# Patient Record
Sex: MALE | Race: OTHER | NOT HISPANIC OR LATINO | Employment: FULL TIME | URBAN - METROPOLITAN AREA
[De-identification: names, ages, dates, MRNs, and addresses within clinical notes are randomized per-mention and may not be internally consistent; named-entity substitution may affect disease eponyms.]

---

## 2020-07-10 ENCOUNTER — HOSPITAL ENCOUNTER (EMERGENCY)
Facility: HOSPITAL | Age: 50
Discharge: HOME | End: 2020-07-10
Attending: EMERGENCY MEDICINE | Admitting: EMERGENCY MEDICINE
Payer: COMMERCIAL

## 2020-07-10 VITALS
TEMPERATURE: 98.3 F | WEIGHT: 154.98 LBS | HEIGHT: 73 IN | RESPIRATION RATE: 16 BRPM | SYSTOLIC BLOOD PRESSURE: 144 MMHG | OXYGEN SATURATION: 98 % | HEART RATE: 93 BPM | BODY MASS INDEX: 20.54 KG/M2 | DIASTOLIC BLOOD PRESSURE: 89 MMHG

## 2020-07-10 DIAGNOSIS — T63.441A LOCAL REACTION TO BEE STING, ACCIDENTAL OR UNINTENTIONAL, INITIAL ENCOUNTER: Primary | ICD-10-CM

## 2020-07-10 PROCEDURE — 99284 EMERGENCY DEPT VISIT MOD MDM: CPT | Performed by: EMERGENCY MEDICINE

## 2020-07-10 PROCEDURE — 99283 EMERGENCY DEPT VISIT LOW MDM: CPT

## 2020-07-10 RX ORDER — PREDNISONE 20 MG/1
40 TABLET ORAL ONCE
Status: COMPLETED | OUTPATIENT
Start: 2020-07-10 | End: 2020-07-10

## 2020-07-10 RX ORDER — DIPHENHYDRAMINE HCL 25 MG
25-50 TABLET ORAL EVERY 6 HOURS PRN
Qty: 20 TABLET | Refills: 0 | Status: SHIPPED | OUTPATIENT
Start: 2020-07-10

## 2020-07-10 RX ORDER — EPINEPHRINE 0.3 MG/.3ML
0.3 INJECTION SUBCUTANEOUS ONCE
Qty: 2 EACH | Refills: 0 | Status: SHIPPED | OUTPATIENT
Start: 2020-07-10 | End: 2020-07-10 | Stop reason: SDUPTHER

## 2020-07-10 RX ORDER — PREDNISONE 20 MG/1
40 TABLET ORAL DAILY
Qty: 10 TABLET | Refills: 0 | Status: SHIPPED | OUTPATIENT
Start: 2020-07-10 | End: 2020-07-15

## 2020-07-10 RX ORDER — EPINEPHRINE 0.3 MG/.3ML
0.3 INJECTION SUBCUTANEOUS ONCE
Qty: 2 EACH | Refills: 0 | Status: SHIPPED | OUTPATIENT
Start: 2020-07-10 | End: 2020-07-10

## 2020-07-10 RX ORDER — PREDNISONE 20 MG/1
40 TABLET ORAL DAILY
Qty: 10 TABLET | Refills: 0 | Status: SHIPPED | OUTPATIENT
Start: 2020-07-10 | End: 2020-07-10 | Stop reason: SDUPTHER

## 2020-07-10 RX ORDER — DIPHENHYDRAMINE HCL 25 MG
25-50 TABLET ORAL EVERY 6 HOURS PRN
Qty: 20 TABLET | Refills: 0 | Status: SHIPPED | OUTPATIENT
Start: 2020-07-10 | End: 2020-07-10 | Stop reason: SDUPTHER

## 2020-07-10 RX ORDER — DIPHENHYDRAMINE HCL 25 MG
50 TABLET ORAL ONCE
Status: COMPLETED | OUTPATIENT
Start: 2020-07-10 | End: 2020-07-10

## 2020-07-10 RX ADMIN — DIPHENHYDRAMINE HCL 50 MG: 25 TABLET, COATED ORAL at 18:39

## 2020-07-10 RX ADMIN — PREDNISONE 40 MG: 20 TABLET ORAL at 18:39

## 2020-07-10 NOTE — ED PROVIDER NOTES
Pt Name: Justice Cordero  MRN: [id number]  [place] 1970  Age/Sex: 52 y o  male  Date of evaluation: 7/10/2020  PCP: No primary care provider on file  CHIEF COMPLAINT    Chief Complaint   Patient presents with    Bee Sting     patient reports getting stung 3x by bees this afternoon  some swelling to leg noted at the site  airway patent          HPI    52 y o  male presenting with multiple bee stings  Patient states that he was stung in the hand as well as both legs after over turning a rock  And uncovering a nest of bees or perhaps hornets or wasps  Patient states he was stung once in each leg as well as the right hand approximately 5-1/2 hours ago  Did not think any stingers are still stuck in the hand  He complains of pain and swelling to the areas of the stings, also states new feels like his chest is intermittently been slightly tight although he denies any swelling in the mouth or throat or any shortness of breath  He denies any other injuries or symptoms  Patient is not known to be allergic to bees  HPI      Past Medical and Surgical History    History reviewed  No pertinent past medical history  History reviewed  No pertinent surgical history  History reviewed  No pertinent family history  Social History     Tobacco Use    Smoking status: Never Smoker    Smokeless tobacco: Never Used   Substance Use Topics    Alcohol use: Yes     Frequency: Monthly or less     Drinks per session: 1 or 2     Binge frequency: Never    Drug use: Never           Allergies    No Known Allergies    Home Medications    Prior to Admission medications    Not on File           Review of Systems    Review of Systems   Constitutional: Negative for appetite change, chills and diaphoresis  HENT: Negative for drooling, facial swelling, trouble swallowing and voice change  Respiratory: Negative for apnea, shortness of breath and wheezing  Cardiovascular: Negative for chest pain and leg swelling  Gastrointestinal: Negative for abdominal distention, abdominal pain, diarrhea, nausea and vomiting  Genitourinary: Negative for dysuria and urgency  Musculoskeletal: Negative for arthralgias, back pain, gait problem and neck pain  Skin: Positive for rash and wound  Negative for color change  Neurological: Negative for seizures, speech difficulty, weakness and headaches  Psychiatric/Behavioral: Negative for agitation, behavioral problems and dysphoric mood  The patient is not nervous/anxious  All other systems reviewed and negative  Physical Exam      ED Triage Vitals [07/10/20 1751]   Temperature Pulse Respirations Blood Pressure SpO2   98 3 °F (36 8 °C) 93 16 144/89 98 %      Temp Source Heart Rate Source Patient Position - Orthostatic VS BP Location FiO2 (%)   Oral Monitor Sitting Left arm --      Pain Score       --               Physical Exam   Constitutional: He is oriented to person, place, and time  He appears well-developed and well-nourished  HENT:   Head: Normocephalic and atraumatic  Oropharynx clear and widely patent   Eyes: Pupils are equal, round, and reactive to light  Conjunctivae and EOM are normal    Neck: Normal range of motion  Neck supple  No tracheal deviation present  Cardiovascular: Normal rate, regular rhythm, normal heart sounds and intact distal pulses  No murmur heard  Pulmonary/Chest: Effort normal and breath sounds normal  No stridor  No respiratory distress  He has no wheezes  He has no rales  Abdominal: Soft  He exhibits no distension  There is no tenderness  There is no rebound and no guarding  Musculoskeletal: Normal range of motion  He exhibits no edema or deformity  Neurological: He is alert and oriented to person, place, and time  Skin: Skin is warm and dry  Rash noted  Erythematous itchy slightly tender to palpation areas with central puncture wounds to the left knee, right knee, and right hand involving the index finger    No stingers visible in any of these lesions  Psychiatric: He has a normal mood and affect  His behavior is normal  Judgment and thought content normal    Nursing note and vitals reviewed  Diagnostic Results      Labs:    Results Reviewed     None          All labs reviewed and utilized in the medical decision making process    Radiology:    No orders to display       All radiology studies independently viewed by me and interpreted by the radiologist     Procedure    Procedures        ED Course of Care and Re-Assessments      Started on prednisone and Benadryl  Medications   predniSONE tablet 40 mg (40 mg Oral Given 7/10/20 1839)   diphenhydrAMINE (BENADRYL) tablet 50 mg (50 mg Oral Given 7/10/20 1839)           FINAL IMPRESSION    Final diagnoses:   Local reaction to bee sting, accidental or unintentional, initial encounter         DISPOSITION/PLAN    Presentation as above most consistent with a local reaction to bee stings  Vital signs examination overall reassuring with no evidence of airway compromise, no generalized skin reaction, no GI symptoms and no respiratory symptoms  Not meeting criteria for anaphylaxis  Started on Benadryl and prednisone based on multiple areas of local reaction, counseled regarding possibility of mild allergic reaction verses simple reaction to bee venom  Discharged strict return precautions, prednisone, Benadryl, EpiPen, counseled on use of EpiPen in case he should get more severe symptoms with future stings  Hemodynamically stable and comfortable at time of discharge    Time reflects when diagnosis was documented in both MDM as applicable and the Disposition within this note     Time User Action Codes Description Comment    7/10/2020  6:32 PM Kashif Cardenas [I01 477O] Local reaction to bee sting, accidental or unintentional, initial encounter     7/10/2020  6:32 PM Kashif Cardenas [D99 096Y] Allergic reaction to bee sting     7/10/2020  6:32 PM Chris Shirley, Mayra Pearson Remove [H21 485N] Allergic reaction to bee sting       ED Disposition     ED Disposition Condition Date/Time Comment    Discharge Stable Fri Jul 10, 2020  6:32 PM Lina Acuña discharge to home/self care  Follow-up Information     Follow up With Specialties Details Why Contact Info Additional [address] [facility] Emergency Department Emergency Medicine Go to  If symptoms worsen [address] [facility] [address] [place], [place], [address]    Your primary care doctor  Call in 3 days To discuss this visit and further care as needed              [facility]  [address] [facility]  [phone]  Go to   If symptoms worsen    Your primary care doctor    Call in 3 days  To discuss this visit and further care as needed      DISCHARGE MEDICATIONS:    Discharge Medication List as of 7/10/2020  6:33 PM      START taking these medications    Details   diphenhydrAMINE (BENADRYL) 25 mg tablet Take 1-2 tablets (25-50 mg total) by mouth every 6 (six) hours as needed for itching, Starting Fri 7/10/2020, Print      EPINEPHrine (EPIPEN) 0 3 mg/0 3 mL SOAJ Inject 0 3 mL (0 3 mg total) into a muscle once for 1 dose, Starting Fri 7/10/2020, Print      predniSONE 20 mg tablet Take 2 tablets (40 mg total) by mouth daily for 5 days, Starting Fri 7/10/2020, Until Wed 7/15/2020, Print             No discharge procedures on file           MD Radha Horton MD  07/10/20 2011